# Patient Record
Sex: FEMALE | Race: OTHER | Employment: OTHER | URBAN - METROPOLITAN AREA
[De-identification: names, ages, dates, MRNs, and addresses within clinical notes are randomized per-mention and may not be internally consistent; named-entity substitution may affect disease eponyms.]

---

## 2020-02-11 NOTE — PATIENT DISCUSSION
Rec: lower IOP OU.  Discussed SLT OU.  Pt elects to proceed.  Will schedule.  If good response, possibly hold Dorzolamide.  Advised pt she will most likely need a minimum of 1 glaucoma gtt.

## 2020-05-28 NOTE — PATIENT DISCUSSION
SLT OD TODAY.   If good response, +/- SLT OS next visit.  Possibly hold Dorzolamide.  Advised pt she will most likely need a minimum of 1 glaucoma gtt.

## 2020-06-25 NOTE — PROCEDURE NOTE: CLINICAL
PROCEDURE NOTE: SLT #1 OS. Diagnosis: Glaucoma Suspect, High Risk. Anesthesia: Topical. Prep: Alphagan 0.15%. Prior to treatment, risks/benefits/alternatives discussed including infection, loss of vision, hemorrhage, cataract, glaucoma, retinal tears or detachment. Lens:  SLT laser lens with goniosol. Power: 1.2mJ. Total applications: 386. Application 672 degrees. Patient tolerated procedure well. There were no complications. Post-op instructions given. Post-op IOP = 18 mmHg. Isidro Mukherjee

## 2020-06-25 NOTE — PATIENT DISCUSSION
IOP FLUCTUATING AT A HIGHER LEVER TODAY BUT GOOD RESPONSE TO SLT OD. PLAN: SLT OS TODAY. WILL HOLD DORZOLAMIDE OU BUT WILL NEED TO CONTINUE TRAVATAN OU QHS.

## 2020-06-25 NOTE — PATIENT DISCUSSION
WILL SEE AN O.D AT [facility] SINCE DR. Magan Holland DOESN'T TAKE PT'S INSURANCE. IF IOP ELEVATED NEXT VISIT, RESTART DORZOLAMIDE.

## 2020-08-04 NOTE — PATIENT DISCUSSION
Good response to SLT but IOP still elevated. Will switch to latanoprost for better insurance coverage and add brimonidine 0.2% as dorzolamide is uncomfortable.

## 2021-01-08 ENCOUNTER — NEW PATIENT COMPREHENSIVE (OUTPATIENT)
Dept: URBAN - METROPOLITAN AREA CLINIC 47 | Facility: CLINIC | Age: 86
End: 2021-01-08

## 2021-01-08 DIAGNOSIS — H25.89: ICD-10-CM

## 2021-01-08 DIAGNOSIS — H25.811: ICD-10-CM

## 2021-01-08 DIAGNOSIS — H33.302: ICD-10-CM

## 2021-01-08 DIAGNOSIS — H25.812: ICD-10-CM

## 2021-01-08 DIAGNOSIS — H04.123: ICD-10-CM

## 2021-01-08 DIAGNOSIS — H52.7: ICD-10-CM

## 2021-01-08 DIAGNOSIS — H40.1434: ICD-10-CM

## 2021-01-08 PROCEDURE — 92015 DETERMINE REFRACTIVE STATE: CPT

## 2021-01-08 PROCEDURE — 92004 COMPRE OPH EXAM NEW PT 1/>: CPT

## 2021-01-08 ASSESSMENT — VISUAL ACUITY
OD_CC: J12
OS_CC: J3
OD_SC: 20/200
OS_SC: J12
OS_BAT: >20/400
OD_SC: J12
OD_BAT: >20/400
OS_CC: 20/70+1
OD_CC: 20/200+1
OS_PH: 20/50-1
OS_SC: 20/200

## 2021-01-08 ASSESSMENT — TONOMETRY
OD_IOP_MMHG: 6
OS_IOP_MMHG: 12

## 2021-04-07 ENCOUNTER — TECH ONLY (OUTPATIENT)
Dept: URBAN - METROPOLITAN AREA CLINIC 43 | Facility: CLINIC | Age: 86
End: 2021-04-07

## 2021-04-07 DIAGNOSIS — H40.1434: ICD-10-CM

## 2021-04-07 PROCEDURE — 99211T TECH SERVICE

## 2021-04-07 PROCEDURE — 92083 EXTENDED VISUAL FIELD XM: CPT

## 2021-04-16 ENCOUNTER — FOLLOW UP (OUTPATIENT)
Dept: URBAN - METROPOLITAN AREA CLINIC 43 | Facility: CLINIC | Age: 86
End: 2021-04-16

## 2021-04-16 DIAGNOSIS — H40.1434: ICD-10-CM

## 2021-04-16 PROCEDURE — 92133 CPTRZD OPH DX IMG PST SGM ON: CPT

## 2021-04-16 PROCEDURE — 92012 INTRM OPH EXAM EST PATIENT: CPT

## 2021-04-16 ASSESSMENT — VISUAL ACUITY
OS_PH: 20/30-1
OD_PH: 20/60-2
OD_CC: 20/150
OS_CC: 20/50-1

## 2021-04-16 ASSESSMENT — TONOMETRY
OD_IOP_MMHG: 12
OS_IOP_MMHG: 12
OS_IOP_MMHG: 14
OD_IOP_MMHG: 10

## 2021-10-22 ENCOUNTER — ESTABLISHED PATIENT (OUTPATIENT)
Dept: URBAN - METROPOLITAN AREA CLINIC 43 | Facility: CLINIC | Age: 86
End: 2021-10-22

## 2021-10-22 DIAGNOSIS — H53.10: ICD-10-CM

## 2021-10-22 DIAGNOSIS — H40.1412: ICD-10-CM

## 2021-10-22 DIAGNOSIS — H40.1421: ICD-10-CM

## 2021-10-22 PROCEDURE — 92012 INTRM OPH EXAM EST PATIENT: CPT

## 2021-10-22 PROCEDURE — 92133 CPTRZD OPH DX IMG PST SGM ON: CPT

## 2021-10-22 PROCEDURE — 76514 ECHO EXAM OF EYE THICKNESS: CPT

## 2021-10-22 ASSESSMENT — PACHYMETRY
OS_CT_UM: 483
OD_CT_UM: 457

## 2021-10-22 ASSESSMENT — TONOMETRY
OD_IOP_MMHG: 10
OS_IOP_MMHG: 09

## 2021-10-22 ASSESSMENT — VISUAL ACUITY
OS_CC: 20/50+2
OD_CC: 20/200

## 2022-01-21 ENCOUNTER — FOLLOW UP (OUTPATIENT)
Dept: URBAN - METROPOLITAN AREA CLINIC 43 | Facility: CLINIC | Age: 87
End: 2022-01-21

## 2022-01-21 DIAGNOSIS — H40.1412: ICD-10-CM

## 2022-01-21 DIAGNOSIS — H40.1434: ICD-10-CM

## 2022-01-21 DIAGNOSIS — H40.1421: ICD-10-CM

## 2022-01-21 DIAGNOSIS — H33.302: ICD-10-CM

## 2022-01-21 PROCEDURE — 92499OP2 OPTOMAP RETINAL SCREENING BOTH EYES

## 2022-01-21 PROCEDURE — 92012 INTRM OPH EXAM EST PATIENT: CPT

## 2022-01-21 ASSESSMENT — TONOMETRY
OS_IOP_MMHG: 18
OD_IOP_MMHG: 16
OS_IOP_MMHG: 15
OD_IOP_MMHG: 17

## 2022-01-21 ASSESSMENT — VISUAL ACUITY
OD_CC: 20/200
OS_BAT: 20/400
OS_CC: 20/50+1
OD_BAT: >20/400

## 2022-01-27 ENCOUNTER — CONSULTATION/EVALUATION (OUTPATIENT)
Dept: URBAN - METROPOLITAN AREA CLINIC 43 | Facility: CLINIC | Age: 87
End: 2022-01-27

## 2022-01-27 DIAGNOSIS — H40.1112: ICD-10-CM

## 2022-01-27 DIAGNOSIS — H33.302: ICD-10-CM

## 2022-01-27 DIAGNOSIS — H18.513: ICD-10-CM

## 2022-01-27 DIAGNOSIS — H40.1412: ICD-10-CM

## 2022-01-27 DIAGNOSIS — H40.1421: ICD-10-CM

## 2022-01-27 DIAGNOSIS — H40.1121: ICD-10-CM

## 2022-01-27 DIAGNOSIS — H25.813: ICD-10-CM

## 2022-01-27 PROCEDURE — 92250 FUNDUS PHOTOGRAPHY W/I&R: CPT

## 2022-01-27 PROCEDURE — 92136TC INTERFEROMETRY - TECHNICAL COMPONENT

## 2022-01-27 PROCEDURE — 92014 COMPRE OPH EXAM EST PT 1/>: CPT

## 2022-01-27 PROCEDURE — 92134 CPTRZ OPH DX IMG PST SGM RTA: CPT

## 2022-01-27 PROCEDURE — 92020 GONIOSCOPY: CPT

## 2022-01-27 PROCEDURE — 92025-3 CORNEAL TOPO, REFUSED

## 2022-01-27 PROCEDURE — 92286 ANT SGM IMG I&R SPECLR MIC: CPT

## 2022-01-27 RX ORDER — MOXIFLOXACIN OPHTHALMIC 5 MG/ML: 1 SOLUTION/ DROPS OPHTHALMIC

## 2022-01-27 RX ORDER — PREDNISOLONE ACETATE 10 MG/ML: 1 SUSPENSION/ DROPS OPHTHALMIC

## 2022-01-27 RX ORDER — KETOROLAC TROMETHAMINE 5 MG/ML: 1 SOLUTION OPHTHALMIC

## 2022-01-27 ASSESSMENT — TONOMETRY
OS_IOP_MMHG: 12
OD_IOP_MMHG: 11

## 2022-01-27 ASSESSMENT — VISUAL ACUITY
OS_CC: J3
OS_SC: 20/200
OD_BAT: 20/400
OD_RAM: 20/25-2
OD_CC: J12
OS_CC: 20/70-2
OS_AM: 20/20
OD_CC: 20/400
OS_BAT: 20/100
OD_SC: CF 3FT
OD_SC: >J12
OS_SC: J12

## 2022-02-28 ENCOUNTER — PRE-OP/H&P (OUTPATIENT)
Dept: URBAN - METROPOLITAN AREA CLINIC 39 | Facility: CLINIC | Age: 87
End: 2022-02-28

## 2022-02-28 DIAGNOSIS — H40.1121: ICD-10-CM

## 2022-02-28 DIAGNOSIS — H40.1434: ICD-10-CM

## 2022-02-28 DIAGNOSIS — H40.1112: ICD-10-CM

## 2022-02-28 DIAGNOSIS — H25.813: ICD-10-CM

## 2022-02-28 PROCEDURE — 99211HP H&P OFFICE/OUTPATIENT VISIT, EST

## 2022-12-27 ENCOUNTER — APPOINTMENT (RX ONLY)
Dept: URBAN - METROPOLITAN AREA CLINIC 137 | Facility: CLINIC | Age: 87
Setting detail: DERMATOLOGY
End: 2022-12-27

## 2022-12-27 DIAGNOSIS — I83.1 VARICOSE VEINS OF LOWER EXTREMITIES WITH INFLAMMATION: ICD-10-CM

## 2022-12-27 DIAGNOSIS — L56.5 DISSEMINATED SUPERFICIAL ACTINIC POROKERATOSIS (DSAP): ICD-10-CM

## 2022-12-27 DIAGNOSIS — L57.0 ACTINIC KERATOSIS: ICD-10-CM

## 2022-12-27 PROBLEM — I83.12 VARICOSE VEINS OF LEFT LOWER EXTREMITY WITH INFLAMMATION: Status: ACTIVE | Noted: 2022-12-27

## 2022-12-27 PROBLEM — I83.11 VARICOSE VEINS OF RIGHT LOWER EXTREMITY WITH INFLAMMATION: Status: ACTIVE | Noted: 2022-12-27

## 2022-12-27 PROCEDURE — ? PRESCRIPTION MEDICATION MANAGEMENT

## 2022-12-27 PROCEDURE — ? COUNSELING

## 2022-12-27 PROCEDURE — 17000 DESTRUCT PREMALG LESION: CPT

## 2022-12-27 PROCEDURE — ? LIQUID NITROGEN

## 2022-12-27 PROCEDURE — 99213 OFFICE O/P EST LOW 20 MIN: CPT | Mod: 25

## 2022-12-27 ASSESSMENT — LOCATION DETAILED DESCRIPTION DERM
LOCATION DETAILED: RIGHT DISTAL PRETIBIAL REGION
LOCATION DETAILED: RIGHT PROXIMAL DORSAL FOREARM
LOCATION DETAILED: LEFT DISTAL PRETIBIAL REGION
LOCATION DETAILED: LEFT PROXIMAL DORSAL FOREARM
LOCATION DETAILED: LEFT UPPER CUTANEOUS LIP
LOCATION DETAILED: RIGHT PROXIMAL PRETIBIAL REGION

## 2022-12-27 ASSESSMENT — LOCATION SIMPLE DESCRIPTION DERM
LOCATION SIMPLE: LEFT FOREARM
LOCATION SIMPLE: RIGHT PRETIBIAL REGION
LOCATION SIMPLE: LEFT LIP
LOCATION SIMPLE: LEFT PRETIBIAL REGION
LOCATION SIMPLE: RIGHT FOREARM

## 2022-12-27 ASSESSMENT — LOCATION ZONE DERM
LOCATION ZONE: LEG
LOCATION ZONE: ARM
LOCATION ZONE: LIP

## 2022-12-27 NOTE — PROCEDURE: COUNSELING
Detail Level: Detailed
Detail Level: Zone
Patient Specific Counseling (Will Not Stick From Patient To Patient): INCREASE OTC MOISTURIZER

## 2022-12-27 NOTE — PROCEDURE: PRESCRIPTION MEDICATION MANAGEMENT
Render In Strict Bullet Format?: No
Detail Level: Zone
Initiate Treatment: Triamcinolone to rough areas on arms and legs as needed. Do not use more than 2 weeks. Patient has medication.
Initiate Treatment: Triamcinolone 0.25% cream bid x 2 weeks on, 1 week off prn itch on legs

## 2022-12-27 NOTE — PROCEDURE: LIQUID NITROGEN
Duration Of Freeze Thaw-Cycle (Seconds): 1
Post-Care Instructions: I reviewed with the patient in detail post-care instructions. Patient is to wear sunprotection, and avoid picking at any of the treated lesions. Pt may apply Vaseline to crusted or scabbing areas.
Show Applicator Variable?: Yes
Render Note In Bullet Format When Appropriate: No
Consent: The patient's consent was obtained including but not limited to risks of crusting, scabbing, blistering, scarring, darker or lighter pigmentary change, recurrence, incomplete removal and infection.
Number Of Freeze-Thaw Cycles: 2 freeze-thaw cycles
Detail Level: Detailed

## 2023-11-30 NOTE — PROCEDURE NOTE: CLINICAL
PROCEDURE NOTE: SLT #1 OD. Diagnosis: Ocular Hypertension. Anesthesia: Topical. Prep: Alphagan 0.15%. Prior to treatment, risks/benefits/alternatives discussed including infection, loss of vision, hemorrhage, cataract, glaucoma, retinal tears or detachment. Lens:  SLT laser lens with goniosol. Power: 1.2mJ. Total applications: 414. Application 821 degrees. Patient tolerated procedure well. There were no complications. Post-op instructions given. Post-op IOP = 22 mmHg. Kavya Pollen
no
